# Patient Record
Sex: MALE | Race: WHITE
[De-identification: names, ages, dates, MRNs, and addresses within clinical notes are randomized per-mention and may not be internally consistent; named-entity substitution may affect disease eponyms.]

---

## 2020-02-28 ENCOUNTER — RX ONLY (RX ONLY)
Age: 55
End: 2020-02-28

## 2020-02-28 ENCOUNTER — DOCTOR'S OFFICE (OUTPATIENT)
Dept: URBAN - METROPOLITAN AREA CLINIC 136 | Facility: CLINIC | Age: 55
Setting detail: OPHTHALMOLOGY
End: 2020-02-28
Payer: COMMERCIAL

## 2020-02-28 DIAGNOSIS — H52.4: ICD-10-CM

## 2020-02-28 DIAGNOSIS — H52.223: ICD-10-CM

## 2020-02-28 PROCEDURE — 92014 COMPRE OPH EXAM EST PT 1/>: CPT | Performed by: OPTOMETRIST

## 2020-02-28 PROCEDURE — 92015 DETERMINE REFRACTIVE STATE: CPT | Performed by: OPTOMETRIST

## 2020-02-28 ASSESSMENT — KERATOMETRY
OS_AXISANGLE_DEGREES: 070
OD_K1POWER_DIOPTERS: 43.25
OS_K1POWER_DIOPTERS: 43.50
OS_K2POWER_DIOPTERS: 44.75
OD_AXISANGLE_DEGREES: 091
OD_K2POWER_DIOPTERS: 44.50

## 2020-02-28 ASSESSMENT — REFRACTION_MANIFEST
OD_AXIS: 095
OD_VA1: 20/20
OD_ADD: +1.75
OS_VA1: 20/20
OS_CYLINDER: -1.00
OD_SPHERE: +0.25
OS_SPHERE: -0.50
OS_AXIS: 065
OS_ADD: +1.75
OD_VA2: 20/20 OU
OD_CYLINDER: -0.75

## 2020-02-28 ASSESSMENT — REFRACTION_AUTOREFRACTION
OD_AXIS: 103
OD_SPHERE: +1.25
OD_CYLINDER: -1.50
OS_SPHERE: +0.75
OS_AXIS: 058
OS_CYLINDER: -2.00

## 2020-02-28 ASSESSMENT — VISUAL ACUITY
OD_BCVA: 20/25-3
OS_BCVA: 20/20-1

## 2020-02-28 ASSESSMENT — SPHEQUIV_DERIVED
OD_SPHEQUIV: 0.5
OS_SPHEQUIV: -1
OD_SPHEQUIV: -0.125
OS_SPHEQUIV: -0.25

## 2020-02-28 ASSESSMENT — AXIALLENGTH_DERIVED
OS_AL: 23.753
OD_AL: 23.264
OD_AL: 23.5032
OS_AL: 23.4605

## 2020-02-28 ASSESSMENT — CONFRONTATIONAL VISUAL FIELD TEST (CVF)
OS_FINDINGS: FULL
OD_FINDINGS: FULL

## 2020-12-11 ENCOUNTER — DOCTOR'S OFFICE (OUTPATIENT)
Dept: URBAN - METROPOLITAN AREA CLINIC 136 | Facility: CLINIC | Age: 55
Setting detail: OPHTHALMOLOGY
End: 2020-12-11
Payer: COMMERCIAL

## 2020-12-11 DIAGNOSIS — H01.001: ICD-10-CM

## 2020-12-11 DIAGNOSIS — H01.004: ICD-10-CM

## 2020-12-11 DIAGNOSIS — H01.005: ICD-10-CM

## 2020-12-11 DIAGNOSIS — H01.002: ICD-10-CM

## 2020-12-11 PROCEDURE — 99213 OFFICE O/P EST LOW 20 MIN: CPT | Performed by: OPTOMETRIST

## 2020-12-11 ASSESSMENT — AXIALLENGTH_DERIVED
OD_AL: 23.264
OD_AL: 23.5032
OS_AL: 23.4605
OS_AL: 23.753

## 2020-12-11 ASSESSMENT — LID EXAM ASSESSMENTS
OD_BLEPHARITIS: T 1+
OS_BLEPHARITIS: 3+

## 2020-12-11 ASSESSMENT — REFRACTION_AUTOREFRACTION
OD_SPHERE: +1.25
OS_AXIS: 058
OD_AXIS: 103
OS_CYLINDER: -2.00
OD_CYLINDER: -1.50
OS_SPHERE: +0.75

## 2020-12-11 ASSESSMENT — REFRACTION_MANIFEST
OD_VA2: 20/20 OU
OS_ADD: +1.75
OD_SPHERE: +0.25
OD_AXIS: 095
OD_CYLINDER: -0.75
OS_CYLINDER: -1.00
OD_VA1: 20/20
OS_SPHERE: -0.50
OS_AXIS: 065
OS_VA1: 20/20
OD_ADD: +1.75

## 2020-12-11 ASSESSMENT — CONFRONTATIONAL VISUAL FIELD TEST (CVF)
OD_FINDINGS: FULL
OS_FINDINGS: FULL

## 2020-12-11 ASSESSMENT — KERATOMETRY
OS_AXISANGLE_DEGREES: 070
OD_K2POWER_DIOPTERS: 44.50
OS_K2POWER_DIOPTERS: 44.75
OD_AXISANGLE_DEGREES: 091
OD_K1POWER_DIOPTERS: 43.25
OS_K1POWER_DIOPTERS: 43.50

## 2020-12-11 ASSESSMENT — SPHEQUIV_DERIVED
OS_SPHEQUIV: -1
OD_SPHEQUIV: -0.125
OD_SPHEQUIV: 0.5
OS_SPHEQUIV: -0.25

## 2020-12-11 ASSESSMENT — VISUAL ACUITY
OD_BCVA: 20/25-2
OS_BCVA: 20/20-1

## 2020-12-11 ASSESSMENT — CORNEAL TRAUMA - ABRASION: OS_ABRASION: ABSENT

## 2020-12-23 ENCOUNTER — DOCTOR'S OFFICE (OUTPATIENT)
Dept: URBAN - METROPOLITAN AREA CLINIC 136 | Facility: CLINIC | Age: 55
Setting detail: OPHTHALMOLOGY
End: 2020-12-23
Payer: COMMERCIAL

## 2020-12-23 DIAGNOSIS — H01.005: ICD-10-CM

## 2020-12-23 DIAGNOSIS — H01.001: ICD-10-CM

## 2020-12-23 DIAGNOSIS — H01.002: ICD-10-CM

## 2020-12-23 DIAGNOSIS — H01.004: ICD-10-CM

## 2020-12-23 PROCEDURE — 99213 OFFICE O/P EST LOW 20 MIN: CPT | Performed by: OPTOMETRIST

## 2020-12-23 ASSESSMENT — KERATOMETRY
OD_AXISANGLE_DEGREES: 091
OD_K1POWER_DIOPTERS: 43.25
OD_K2POWER_DIOPTERS: 44.50
OS_AXISANGLE_DEGREES: 070
OS_K1POWER_DIOPTERS: 43.50
OS_K2POWER_DIOPTERS: 44.75

## 2020-12-23 ASSESSMENT — REFRACTION_AUTOREFRACTION
OD_SPHERE: +0.75
OS_AXIS: 068
OD_CYLINDER: -1.00
OS_SPHERE: +0.07
OS_CYLINDER: -3.00
OD_AXIS: 100

## 2020-12-23 ASSESSMENT — VISUAL ACUITY
OS_BCVA: 20/20-1
OD_BCVA: 20/30-2

## 2020-12-23 ASSESSMENT — SPHEQUIV_DERIVED
OS_SPHEQUIV: -1.43
OS_SPHEQUIV: -1
OD_SPHEQUIV: 0.25
OD_SPHEQUIV: -0.125

## 2020-12-23 ASSESSMENT — REFRACTION_MANIFEST
OD_VA2: 20/20 OU
OS_SPHERE: -0.50
OD_ADD: +1.75
OD_VA1: 20/20
OS_AXIS: 065
OD_CYLINDER: -0.75
OD_SPHERE: +0.25
OS_CYLINDER: -1.00
OD_AXIS: 095
OS_ADD: +1.75
OS_VA1: 20/20

## 2020-12-23 ASSESSMENT — LID EXAM ASSESSMENTS
OD_BLEPHARITIS: T 1+
OS_BLEPHARITIS: 1+

## 2020-12-23 ASSESSMENT — CORNEAL TRAUMA - ABRASION: OS_ABRASION: ABSENT

## 2020-12-23 ASSESSMENT — AXIALLENGTH_DERIVED
OD_AL: 23.5032
OS_AL: 23.92
OS_AL: 23.753
OD_AL: 23.3591

## 2021-01-08 ENCOUNTER — DOCTOR'S OFFICE (OUTPATIENT)
Dept: URBAN - METROPOLITAN AREA CLINIC 136 | Facility: CLINIC | Age: 56
Setting detail: OPHTHALMOLOGY
End: 2021-01-08
Payer: COMMERCIAL

## 2021-01-08 ENCOUNTER — RX ONLY (RX ONLY)
Age: 56
End: 2021-01-08

## 2021-01-08 DIAGNOSIS — H01.005: ICD-10-CM

## 2021-01-08 DIAGNOSIS — H01.002: ICD-10-CM

## 2021-01-08 DIAGNOSIS — H01.001: ICD-10-CM

## 2021-01-08 DIAGNOSIS — H01.004: ICD-10-CM

## 2021-01-08 PROBLEM — H52.223 ASTIGMATISM, REGULAR; BOTH EYES: Status: ACTIVE | Noted: 2020-02-28

## 2021-01-08 PROCEDURE — 99213 OFFICE O/P EST LOW 20 MIN: CPT | Performed by: OPTOMETRIST

## 2021-01-08 ASSESSMENT — VISUAL ACUITY
OS_BCVA: 20/20-1
OD_BCVA: 20/30

## 2021-01-08 ASSESSMENT — LID EXAM ASSESSMENTS
OS_BLEPHARITIS: 1+
OD_BLEPHARITIS: T 1+

## 2021-01-08 ASSESSMENT — AXIALLENGTH_DERIVED
OS_AL: 23.92
OD_AL: 23.5032
OD_AL: 23.3591
OS_AL: 23.753

## 2021-01-08 ASSESSMENT — REFRACTION_MANIFEST
OD_ADD: +1.75
OD_SPHERE: +0.25
OD_AXIS: 095
OS_AXIS: 065
OD_VA1: 20/20
OS_ADD: +1.75
OS_VA1: 20/20
OD_VA2: 20/20 OU
OD_CYLINDER: -0.75
OS_CYLINDER: -1.00
OS_SPHERE: -0.50

## 2021-01-08 ASSESSMENT — CONFRONTATIONAL VISUAL FIELD TEST (CVF)
OD_FINDINGS: FULL
OS_FINDINGS: FULL

## 2021-01-08 ASSESSMENT — SPHEQUIV_DERIVED
OD_SPHEQUIV: -0.125
OS_SPHEQUIV: -1.43
OS_SPHEQUIV: -1
OD_SPHEQUIV: 0.25

## 2021-01-08 ASSESSMENT — KERATOMETRY
OS_K1POWER_DIOPTERS: 43.50
OS_K2POWER_DIOPTERS: 44.75
OD_K2POWER_DIOPTERS: 44.50
OD_K1POWER_DIOPTERS: 43.25
OD_AXISANGLE_DEGREES: 091
OS_AXISANGLE_DEGREES: 070

## 2021-01-08 ASSESSMENT — REFRACTION_AUTOREFRACTION
OD_CYLINDER: -1.00
OS_AXIS: 068
OS_CYLINDER: -3.00
OD_AXIS: 100
OS_SPHERE: +0.07
OD_SPHERE: +0.75

## 2021-01-08 ASSESSMENT — CORNEAL TRAUMA - ABRASION: OS_ABRASION: ABSENT

## 2021-04-06 DIAGNOSIS — Z23 ENCOUNTER FOR IMMUNIZATION: ICD-10-CM

## 2024-12-21 ENCOUNTER — OFFICE VISIT (OUTPATIENT)
Age: 59
End: 2024-12-21
Payer: COMMERCIAL

## 2024-12-21 VITALS
HEART RATE: 48 BPM | WEIGHT: 225.53 LBS | RESPIRATION RATE: 18 BRPM | TEMPERATURE: 96.4 F | BODY MASS INDEX: 32.29 KG/M2 | SYSTOLIC BLOOD PRESSURE: 122 MMHG | HEIGHT: 70 IN | DIASTOLIC BLOOD PRESSURE: 70 MMHG | OXYGEN SATURATION: 97 %

## 2024-12-21 DIAGNOSIS — S01.91XA LACERATION OF HEAD WITHOUT FOREIGN BODY, UNSPECIFIED PART OF HEAD, INITIAL ENCOUNTER: Primary | ICD-10-CM

## 2024-12-21 PROCEDURE — 99213 OFFICE O/P EST LOW 20 MIN: CPT | Performed by: PHYSICIAN ASSISTANT

## 2024-12-21 RX ORDER — IBUPROFEN 200 MG
1 TABLET ORAL AS NEEDED
COMMUNITY

## 2024-12-21 RX ORDER — ATORVASTATIN CALCIUM 40 MG/1
40 TABLET, FILM COATED ORAL
COMMUNITY
Start: 2024-11-20

## 2024-12-21 RX ORDER — EZETIMIBE 10 MG/1
10 TABLET ORAL DAILY
COMMUNITY
Start: 2024-07-08

## 2024-12-21 NOTE — PROGRESS NOTES
[facility]      NAME: Kenyon Sutherland is a 59 y.o. male  : 1965    MRN: [id number]  DATE: 2024  TIME: 12:55 PM    Assessment and Plan   Laceration of head without foreign body, unspecified part of head, initial encounter [S01.91XA]  1. Laceration of head without foreign body, unspecified part of head, initial encounter            Patient Instructions   Keep the area clean and dry. Do not soak or go swimming. Do not rub especially the first few days. Do not pick at the glue, it will fall off on its own. If you notice warmth or redness increasing follow up with PCP for wound check. Do not apply antibiotic ointment and this can actually cause the glue to break down faster. Tylenol prn for pain. Educated if he develops a headache, visual issues, n/vomiting or just doesn't feel right to proceed to ER. Risks and benefits discussed. Patient understands and agrees with the plan.      If tests have been performed at [facility], our office will contact you with results if changes need to be made to the care plan discussed with you at the visit.  You can review your full results on [facility] MyChart.     Follow up with PCP in 3-5 days.      If any of the following occur, please report to your nearest ED for evaluation or call 911.   Difficultly breathing or shortness of breath  Chest pain  Acutely worsening symptoms.   To present to the ER if symptoms worsen.  Chief Complaint     Chief Complaint   Patient presents with    Head Laceration     C/o laceration on head. Pt. Slipped on ice pt. Fell on bottom and head went back and hit head on brick. utd on TDAP          History of Present Illness   Kenyon Sutherland presents to the clinic c/o    Head Laceration  This is a new (slipped on ice and fell backwards; landed on butt but then head went backwards and hit a brick (no butt pain currently)) problem. The current episode started today (around an hour ago, fell on a brick, denies any LOC or headache or  visual issues or n/v). Pertinent negatives include no abdominal pain, chest pain, chills, congestion, coughing, diaphoresis, fatigue, fever, headaches, rash, visual change or vomiting. Treatments tried: soap and water and hydrogen peroxide; also took ibu. The treatment provided no relief.   Only on baby aspirin. UTD on tetanus.    Review of Systems   Review of Systems   Constitutional:  Negative for chills, diaphoresis, fatigue and fever.   HENT:  Negative for congestion, ear discharge, ear pain and facial swelling.    Eyes:  Negative for photophobia, pain, discharge, redness, itching and visual disturbance.   Respiratory:  Negative for apnea, cough, chest tightness, shortness of breath and wheezing.    Cardiovascular:  Negative for chest pain and palpitations.   Gastrointestinal:  Negative for abdominal pain and vomiting.   Skin:  Positive for wound. Negative for color change and rash.   Neurological:  Negative for dizziness and headaches.   Hematological:  Negative for adenopathy.         Current Medications     Long-Term Medications   Medication Sig Dispense Refill    atorvastatin (LIPITOR) 40 mg tablet Take 40 mg by mouth      ezetimibe (ZETIA) 10 mg tablet Take 10 mg by mouth daily      ibuprofen (MOTRIN) 200 mg tablet Take 1 tablet by mouth as needed         Current Allergies     Allergies as of 12/21/2024    (No Known Allergies)            The following portions of the patient's history were reviewed and updated as appropriate: allergies, current medications, past family history, past medical history, past social history, past surgical history and problem list.  History reviewed. No pertinent past medical history.  Past Surgical History:   Procedure Laterality Date    FL DISCOGRAM LUMBAR  3/17/2021    FL DISCOGRAM LUMBAR  3/17/2021    FL DISCOGRAM LUMBAR  3/17/2021     Social History     Socioeconomic History    Marital status: /Civil Union     Spouse name: Not on file    Number of children: Not on  file    Years of education: Not on file    Highest education level: Not on file   Occupational History    Not on file   Tobacco Use    Smoking status: Never    Smokeless tobacco: Never   Vaping Use    Vaping status: Never Used   Substance and Sexual Activity    Alcohol use: Yes     Comment: rare    Drug use: Never    Sexual activity: Not on file   Other Topics Concern    Not on file   Social History Narrative    Not on file     Social Drivers of Health     Financial Resource Strain: Low Risk  (10/16/2023)    Received from [facility]    Overall Financial Resource Strain (CARDIA)     Difficulty of Paying Living Expenses: Not hard at all   Food Insecurity: No Food Insecurity (10/16/2023)    Received from [facility]    Hunger Vital Sign     Worried About Running Out of Food in the Last Year: Never true     Ran Out of Food in the Last Year: Never true   Transportation Needs: No Transportation Needs (10/16/2023)    Received from [facility]    PRAPARE - Transportation     Lack of Transportation (Medical): No     Lack of Transportation (Non-Medical): No   Physical Activity: Not on file   Stress: Stress Concern Present (10/16/2023)    Received from [facility]    [facility] - Occupational Stress Questionnaire     Feeling of Stress : To some extent   Social Connections: Moderately Integrated (10/16/2023)    Received from [facility]    Social Connection and Isolation Panel [NHANES]     Frequency of Communication with Friends and Family: More than three times a week     Frequency of Social Gatherings with Friends and Family: More than three times a week     Attends Yarsani Services: 1 to 4 times per year     Active Member of Clubs or Organizations: No     Attends Club or Organization Meetings: Never     Marital Status:    Intimate Partner Violence: Not At Risk (10/16/2023)    Received from [place]  [facility]    Humiliation, Afraid, Rape, and Kick questionnaire     Fear of Current or Ex-Partner: No     Emotionally Abused: No     Physically Abused: No     Sexually Abused: No   Housing Stability: Low Risk  (10/16/2023)    Received from [facility]    Housing Stability Vital Sign     Unable to Pay for Housing in the Last Year: No     Number of Places Lived in the Last Year: 1     Unstable Housing in the Last Year: No       Objective   /70 (BP Location: Right arm, Patient Position: Sitting)   Pulse (!) 48   Temp (!) 96.4 °F (35.8 °C)   Resp 18   Ht 5' 10\" (1.778 m)   Wt 102 kg (225 lb 8.5 oz)   SpO2 97%   BMI 32.36 kg/m²      Physical Exam     Physical Exam  Vitals and nursing note reviewed.   Constitutional:       General: He is not in acute distress.     Appearance: He is well-developed. He is not diaphoretic.   HENT:      Head: Normocephalic.        Right Ear: Tympanic membrane and external ear normal.      Left Ear: Tympanic membrane and external ear normal.      Nose: Nose normal.      Mouth/Throat:      Mouth: Mucous membranes are moist.      Pharynx: No oropharyngeal exudate or posterior oropharyngeal erythema.   Eyes:      General: No scleral icterus.        Right eye: No discharge.         Left eye: No discharge.      Conjunctiva/sclera: Conjunctivae normal.      Pupils: Pupils are equal, round, and reactive to light.   Cardiovascular:      Rate and Rhythm: Normal rate and regular rhythm.      Heart sounds: Normal heart sounds. No murmur heard.     No friction rub. No gallop.   Pulmonary:      Effort: Pulmonary effort is normal. No respiratory distress.      Breath sounds: Normal breath sounds. No decreased breath sounds, wheezing, rhonchi or rales.   Skin:     General: Skin is warm and dry.      Coloration: Skin is not pale.      Findings: No erythema or rash.   Neurological:      Mental Status: He is alert and oriented to person, place, and time.   Psychiatric:   "       Behavior: Behavior normal.         Thought Content: Thought content normal.         Judgment: Judgment normal.       Universal Protocol:  procedure performed by consultantConsent: Verbal consent obtained.  Risks and benefits: risks, benefits and alternatives were discussed  Consent given by: patient  Laceration repair    Date/Time: 12/21/2024 1:30 PM    Performed by: Naz Brower PA-C  Authorized by: Naz Brower PA-C  Body area: head/neck  Location details: scalp  Laceration length: 2 cm      Procedure Details:  Amount of cleaning: standard (dermaklenz)  Debridement: none  Degree of undermining: none  Skin closure: glue  Approximation difficulty: simple  Patient tolerance: patient tolerated the procedure well with no immediate complications         Naz Brower PA-C